# Patient Record
(demographics unavailable — no encounter records)

---

## 2019-11-05 NOTE — MAMMOGRAPHY REPORT
Reason:  ROUTINE MAMMO

Procedure Date:  11/04/2019   

Accession Number:  [id number] / [id number]                    

Procedure:  MGS - Screening Mammo Dig Bilat CPT Code:  

 

***Final Report***

 

 

FULL RESULT:

 

 

EXAM: Screening Mammo Dig Bilat

 

DATE: 11/4/2019 9:29 AM

 

CLINICAL HISTORY:  Screening encounter.

 

TECHNIQUE: (B) - Bilateral  CC, laterally exaggerated CC, MLO views were 

obtained.

 

COMPARISON: 12/22/2015 through 12/3/2010.

 

PARENCHYMAL PATTERN: (D) - The breast(s) demonstrate(s) heterogeneously 

dense fibroglandular parenchyma.

 

FINDINGS:

In the right upper outer breast approximately 4 cm from the nipple is a 

partially obscured hypodense nodule measuring approximately 1.7 cm on a 

background of what appears to be waxing and waning hyperdensities 

throughout both breasts, possibly cysts. This should be further 

characterized by focused right breast ultrasound. There are no suspicious 

masses, calcifications, or areas of distortion in the left breast.

 

IMPRESSION: Incomplete examination.  BI-RADS category 0.

 

RECOMMENDATION: (ADDUS) - Targeted ultrasound recommended. Right breast

 

BI-RADS CATEGORY: (0) - Incomplete Examination - need additional 

evaluation.

 

STANDARD QUALIFYING STATEMENTS:

1.  This examination was reviewed with the aid of Computer-Aided 

Detection (CAD).

2.  A negative or benign  imaging report should not preclude biopsy if 

clinically suspicious findings are present.

3.  Dense breasts may obscure an underlying neoplasm.

4. This examination was reviewed without the aid of 3D breast imaging 

(tomosynthesis).